# Patient Record
Sex: FEMALE | Race: OTHER
[De-identification: names, ages, dates, MRNs, and addresses within clinical notes are randomized per-mention and may not be internally consistent; named-entity substitution may affect disease eponyms.]

---

## 2019-12-17 ENCOUNTER — HOSPITAL ENCOUNTER (EMERGENCY)
Dept: HOSPITAL 61 - ER | Age: 19
Discharge: HOME | End: 2019-12-17
Payer: SELF-PAY

## 2019-12-17 VITALS — HEIGHT: 67 IN | BODY MASS INDEX: 22.76 KG/M2 | WEIGHT: 145 LBS

## 2019-12-17 VITALS — DIASTOLIC BLOOD PRESSURE: 58 MMHG | SYSTOLIC BLOOD PRESSURE: 120 MMHG

## 2019-12-17 DIAGNOSIS — K64.4: Primary | ICD-10-CM

## 2019-12-17 NOTE — PHYS DOC
Adult General


Chief Complaint


Chief Complaint:  HEMORRHOIDS





HPI


HPI





Patient is a 19  year old female who presents with rectal bleeding this been 

ongoing for several months. The patient states when she wipes she sees a little 

blood on the tissue. The patient also states that she saw something outside of 

her rectum that she was able to push back in. The patient states that she's also

been having pain that she rates as 10 out of 10 in severity. No other 

complaints.





Review of Systems


Review of Systems





Constitutional: Denies fever or chills []


Eyes: Denies change in visual acuity, redness, or eye pain []


HENT: Denies nasal congestion or sore throat []


Respiratory: Denies cough or shortness of breath []


Cardiovascular: No additional information not addressed in HPI []


GI: Reports rectal bleeding and pain.


: Denies dysuria or hematuria []


Musculoskeletal: Denies back pain or joint pain []


Integument: Denies rash or skin lesions []


Neurologic: Denies headache, focal weakness or sensory changes []





Complete systems were reviewed and found to be within normal limits, except as 

documented in this note.





Allergies


Allergies





Allergies








Coded Allergies Type Severity Reaction Last Updated Verified


 


  No Known Drug Allergies    12/17/19 No











Physical Exam


Physical Exam





Constitutional: Well developed, well nourished, no acute distress, non-toxic ap

pearance. []


HENT: Normocephalic, atraumatic, bilateral external ears normal, oropharynx 

moist, no oral exudates, nose normal. []


Eyes: EOMI, conjunctiva normal, no discharge. [] 


Neck: Normal range of motion


Skin: Warm, dry, no erythema, no rash. [] 


Neurologic: Alert and oriented X 3, normal motor function, normal sensory 

function, no focal deficits noted. []


Psychologic: Affect normal, judgement normal, mood normal. []


Rectal exam: Shows internal hemorrhoids.





EKG


EKG


[]





Radiology/Procedures


Radiology/Procedures


[]





Course & Med Decision Making


Course & Med Decision Making


Pertinent Labs and Imaging studies reviewed. (See chart for details)





Patient appears to be resting comfortably in room. Patient has hemmroids. 

Discussed with patient that she can get Preparation H over the counter and 

follow label instructions.





A medical screening exam was performed on this patient and the patient does not 

appear to be having a medical emergency. Her symptoms are not of sufficient 

severity and within reasonable medical probability it is unlikely the absence of

 immediate medical attention would result in placing the health of the 

individual (or, with respect to a pregnant woman, the health of the woman or her

 unborn child) in serious jeopardy, serious impairment to bodily functions, or 

serious dysfunction of any bodily organ or part. If pregnancy, the patient is 

not in labor





Dragon Disclaimer


Dragon Disclaimer


This electronic medical record was generated, in whole or in part, using a voice

 recognition dictation system.





Departure


Departure


Impression:  


   Primary Impression:  


   External hemorrhoid


   Additional Impression:  


   Encounter for medical screening examination


Disposition:  01 HOME, SELF-CARE


Condition:  STABLE


Referrals:  


NO PCP (PCP)


Patient Instructions:  Medical Screening Exam





Additional Instructions:  


Thank you for visiting [facility]. We appreciate you trusting us 

with your care. If any additional problems come up don't hesitate to return to 

visit us. Please follow up with your primary care provider so they can plan 

additional care if needed and know about the problem that you had. If symptoms 

worsen come back to the Emergency Department. Any concerning symptoms that start

 such as chest pain, shortness of air, weakness or numbness on one side of the 

body, running high fevers or any other concerning symptoms return to the ER.





Problem Qualifiers











MADHAV DONALDSON          Dec 17, 2019 20:39